# Patient Record
Sex: MALE | Race: WHITE | NOT HISPANIC OR LATINO | Employment: FULL TIME | URBAN - METROPOLITAN AREA
[De-identification: names, ages, dates, MRNs, and addresses within clinical notes are randomized per-mention and may not be internally consistent; named-entity substitution may affect disease eponyms.]

---

## 2023-08-10 ENCOUNTER — APPOINTMENT (OUTPATIENT)
Dept: PRIMARY CARE | Facility: CLINIC | Age: 38
End: 2023-08-10
Payer: COMMERCIAL

## 2023-11-02 ENCOUNTER — TELEPHONE (OUTPATIENT)
Dept: PRIMARY CARE | Facility: CLINIC | Age: 38
End: 2023-11-02

## 2023-11-02 NOTE — TELEPHONE ENCOUNTER
Patient would like to have blood work done before his appointment on 12-19-23.  Wants to have it taken before his insurance resets at the end of the year.  Can you put in the order and let him know through [employer].  Thank you.

## 2023-11-26 DIAGNOSIS — Z00.00 WELL ADULT EXAM: Primary | ICD-10-CM

## 2023-12-19 ENCOUNTER — APPOINTMENT (OUTPATIENT)
Dept: PRIMARY CARE | Facility: CLINIC | Age: 38
End: 2023-12-19
Payer: COMMERCIAL

## 2024-01-25 ENCOUNTER — APPOINTMENT (OUTPATIENT)
Dept: PRIMARY CARE | Facility: CLINIC | Age: 39
End: 2024-01-25
Payer: COMMERCIAL

## 2024-03-19 ENCOUNTER — APPOINTMENT (OUTPATIENT)
Dept: PRIMARY CARE | Facility: CLINIC | Age: 39
End: 2024-03-19
Payer: COMMERCIAL

## 2024-03-29 ENCOUNTER — OFFICE VISIT (OUTPATIENT)
Dept: PRIMARY CARE | Facility: CLINIC | Age: 39
End: 2024-03-29
Payer: COMMERCIAL

## 2024-03-29 VITALS
HEIGHT: 68 IN | DIASTOLIC BLOOD PRESSURE: 88 MMHG | BODY MASS INDEX: 23.16 KG/M2 | WEIGHT: 152.8 LBS | SYSTOLIC BLOOD PRESSURE: 136 MMHG

## 2024-03-29 DIAGNOSIS — Z00.00 WELL ADULT EXAM: Primary | ICD-10-CM

## 2024-03-29 DIAGNOSIS — R10.9 ABDOMINAL WALL PAIN: ICD-10-CM

## 2024-03-29 PROCEDURE — 99395 PREV VISIT EST AGE 18-39: CPT | Performed by: INTERNAL MEDICINE

## 2024-03-29 ASSESSMENT — PATIENT HEALTH QUESTIONNAIRE - PHQ9
1. LITTLE INTEREST OR PLEASURE IN DOING THINGS: NOT AT ALL
2. FEELING DOWN, DEPRESSED OR HOPELESS: NOT AT ALL
SUM OF ALL RESPONSES TO PHQ9 QUESTIONS 1 AND 2: 0

## 2024-03-29 NOTE — PROGRESS NOTES
"SUBJECTIVE:   Negro Melo is a 39 y.o. male presenting for his annual checkup.  No current outpatient medications on file.     No current facility-administered medications for this visit.     Allergies: Patient has no known allergies.     ROS:  Feeling well. No dyspnea or chest pain on exertion. No abdominal pain, change in bowel habits, black or bloody stools. No urinary tract or prostatic symptoms. No neurological complaints.    OBJECTIVE:   The patient appears well, alert, oriented x 3, in no distress.   /88 (BP Location: Left arm, Patient Position: Sitting, BP Cuff Size: Adult)   Ht 1.734 m (5' 8.25\")   Wt 69.3 kg (152 lb 12.8 oz)   BMI 23.06 kg/m²   ENT normal.  Neck supple. No adenopathy or thyromegaly. HEATHER. Lungs are clear, good air entry, no wheezes, rhonchi or rales. S1 and S2 normal, no murmurs, regular rate and rhythm. Abdomen is soft without tenderness, guarding, mass or organomegaly.  Extremities show no edema, normal peripheral pulses. Neurological is normal without focal findings.    ASSESSMENT:   healthy adult male    PLAN:   continue current healthy lifestyle patterns and return for routine annual checkups    #1 lipids-improved. Very low Kotlik risk score. Focus on diet and exercise. Recheck 6 to 12 months  #2 anxiety-doing well without treatment  #3 abd pain- low-grade, migrating.  Coming and going over the past 18 months.  Notably better after massage therapy.  Began after physical injury.  Normal exam.  I believe this is musculoskeletal.  We reviewed this at length.  Reviewed differential diagnosis.  Offered CT scan abdomen.  At this point patient wishes to hold off which I think is reasonable.  Will try physical therapy and follow-up in a few weeks if not resolved.       "

## 2024-03-30 ENCOUNTER — LAB (OUTPATIENT)
Dept: LAB | Facility: LAB | Age: 39
End: 2024-03-30
Payer: COMMERCIAL

## 2024-03-30 DIAGNOSIS — Z00.00 WELL ADULT EXAM: ICD-10-CM

## 2024-03-30 DIAGNOSIS — D72.819 LEUKOPENIA, UNSPECIFIED TYPE: Primary | ICD-10-CM

## 2024-03-30 PROCEDURE — 36415 COLL VENOUS BLD VENIPUNCTURE: CPT

## 2024-03-30 PROCEDURE — 85027 COMPLETE CBC AUTOMATED: CPT

## 2024-03-30 PROCEDURE — 83690 ASSAY OF LIPASE: CPT

## 2024-03-30 PROCEDURE — 80053 COMPREHEN METABOLIC PANEL: CPT

## 2024-03-30 PROCEDURE — 80061 LIPID PANEL: CPT

## 2024-03-30 PROCEDURE — 83036 HEMOGLOBIN GLYCOSYLATED A1C: CPT

## 2024-03-31 LAB
ALBUMIN SERPL BCP-MCNC: 5 G/DL (ref 3.4–5)
ALP SERPL-CCNC: 52 U/L (ref 33–120)
ALT SERPL W P-5'-P-CCNC: 18 U/L (ref 10–52)
ANION GAP SERPL CALC-SCNC: 14 MMOL/L (ref 10–20)
AST SERPL W P-5'-P-CCNC: 18 U/L (ref 9–39)
BILIRUB SERPL-MCNC: 0.8 MG/DL (ref 0–1.2)
BUN SERPL-MCNC: 19 MG/DL (ref 6–23)
CALCIUM SERPL-MCNC: 10 MG/DL (ref 8.6–10.6)
CHLORIDE SERPL-SCNC: 102 MMOL/L (ref 98–107)
CHOLEST SERPL-MCNC: 246 MG/DL (ref 0–199)
CHOLESTEROL/HDL RATIO: 5.1
CO2 SERPL-SCNC: 27 MMOL/L (ref 21–32)
CREAT SERPL-MCNC: 1 MG/DL (ref 0.5–1.3)
EGFRCR SERPLBLD CKD-EPI 2021: >90 ML/MIN/1.73M*2
ERYTHROCYTE [DISTWIDTH] IN BLOOD BY AUTOMATED COUNT: 12.3 % (ref 11.5–14.5)
EST. AVERAGE GLUCOSE BLD GHB EST-MCNC: 94 MG/DL
GLUCOSE SERPL-MCNC: 92 MG/DL (ref 74–99)
HBA1C MFR BLD: 4.9 %
HCT VFR BLD AUTO: 42.9 % (ref 41–52)
HDLC SERPL-MCNC: 48.1 MG/DL
HGB BLD-MCNC: 14.8 G/DL (ref 13.5–17.5)
LDLC SERPL CALC-MCNC: 182 MG/DL
LIPASE SERPL-CCNC: 25 U/L (ref 9–82)
MCH RBC QN AUTO: 31.4 PG (ref 26–34)
MCHC RBC AUTO-ENTMCNC: 34.5 G/DL (ref 32–36)
MCV RBC AUTO: 91 FL (ref 80–100)
NON HDL CHOLESTEROL: 198 MG/DL (ref 0–149)
NRBC BLD-RTO: 0 /100 WBCS (ref 0–0)
PLATELET # BLD AUTO: 329 X10*3/UL (ref 150–450)
POTASSIUM SERPL-SCNC: 4.4 MMOL/L (ref 3.5–5.3)
PROT SERPL-MCNC: 7.5 G/DL (ref 6.4–8.2)
RBC # BLD AUTO: 4.72 X10*6/UL (ref 4.5–5.9)
SODIUM SERPL-SCNC: 139 MMOL/L (ref 136–145)
TRIGL SERPL-MCNC: 82 MG/DL (ref 0–149)
VLDL: 16 MG/DL (ref 0–40)
WBC # BLD AUTO: 4.1 X10*3/UL (ref 4.4–11.3)

## 2024-04-15 ENCOUNTER — APPOINTMENT (OUTPATIENT)
Dept: PRIMARY CARE | Facility: CLINIC | Age: 39
End: 2024-04-15
Payer: COMMERCIAL

## 2024-04-15 ENCOUNTER — TELEPHONE (OUTPATIENT)
Dept: PRIMARY CARE | Facility: CLINIC | Age: 39
End: 2024-04-15

## 2024-04-15 NOTE — TELEPHONE ENCOUNTER
Pt left a msg asking for a phone call to put his mind at ease regarding his WBC count.  He leaves out of town the end of the week and would like to speak to you or have an apt before he leaves.

## 2024-04-24 ENCOUNTER — APPOINTMENT (OUTPATIENT)
Dept: PRIMARY CARE | Facility: CLINIC | Age: 39
End: 2024-04-24
Payer: COMMERCIAL

## 2024-04-26 ENCOUNTER — EVALUATION (OUTPATIENT)
Dept: PHYSICAL THERAPY | Facility: CLINIC | Age: 39
End: 2024-04-26
Payer: COMMERCIAL

## 2024-04-26 DIAGNOSIS — R10.9 ABDOMINAL WALL PAIN: Primary | ICD-10-CM

## 2024-04-26 PROCEDURE — 97110 THERAPEUTIC EXERCISES: CPT | Mod: GP | Performed by: PHYSICAL THERAPIST

## 2024-04-26 PROCEDURE — 97161 PT EVAL LOW COMPLEX 20 MIN: CPT | Mod: GP | Performed by: PHYSICAL THERAPIST

## 2024-04-26 ASSESSMENT — PATIENT HEALTH QUESTIONNAIRE - PHQ9
1. LITTLE INTEREST OR PLEASURE IN DOING THINGS: NOT AT ALL
SUM OF ALL RESPONSES TO PHQ9 QUESTIONS 1 AND 2: 0
2. FEELING DOWN, DEPRESSED OR HOPELESS: NOT AT ALL

## 2024-04-26 ASSESSMENT — ENCOUNTER SYMPTOMS
LOSS OF SENSATION IN FEET: 0
DEPRESSION: 0
OCCASIONAL FEELINGS OF UNSTEADINESS: 0

## 2024-04-26 NOTE — PROGRESS NOTES
Physical Therapy Evaluation    Patient Name: Negro Melo  MRN: [id number]  Today's Date: 4/26/2024  Visit: 1/mn  DOS/DOI:  1/1/23  Referred by:  Ivis Mabry  Time Calculation  Start Time: 1105  Stop Time: 1153  Time Calculation (min): 48 min  PT Evaluation Time Entry  PT Evaluation (Low) Time Entry: 38  PT Therapeutic Procedures Time Entry  Therapeutic Exercise Time Entry: 10                 Diagnosis:   1. Abdominal wall pain  Referral to Physical Therapy    Follow Up In Physical Therapy              PMH  - pelvic floor pain  - R shld dislocation    HPI  In Jan of 2023 he was skiing and came over a patch of man-made snow which jerked his trunk backward and he felt a strain through his abdominals.  He has felt pain at various locations through abdomen ever since.  He went to a massage therapist at [facility] and it went away for 3 months.  It returned and continued to be at various locations.  He has seen massage therapy a few times again which helped but it didn't last as long.  He has also seen his MD and was referred to PT. His bloodwork was negative.  No imagine done.    Precautions  None    SUBJECTIVE  Symptoms:  - he reports pain at the superior aspect of the abdominal mm and along the L side.  It is rated 1-2/10 and described as an annoying discomfort.  No N&T.  No weakness in the abdominal mm or LE.  It increases with stress, prone position, eating, and focusing on the pain.   it decreases if he doesn't think about it, massage, ice and stretching.  Functionally he is limited in sitting as he needs to change position if it becomes uncomfortable.    Patient's Living Environment:  - 2 story home  - his parents will stay with him 4 months out of the [place]  - he works as an .  Sits most of the day, but not limited in ADLS.    Previous Level of Function:  -  no change in activity level  - he still runs    Patient's Therapy Goals:  - find rx that gives more sustainable relief.    OBJECTIVE  Observation:    -  rounded posture. L scap higher than R    Palpation:   - tender at anterior cartilage of L Rib 10   - L ASIS lower    AROM:  - spinal Rot = WFL B, flex = WFL,  ext = WFL, SB = 50%    MMT:  - TA = strong  - upper abdominal = 5/5  - lower abdominal = 4/5  - oblique abdominal = 5/5 B    Special Tests:  - Negative B SLR, compression over load  - Positive PA to T 10 (slight increase in anterior rib pain on L)  - HS tight B (45 with SLR)    Joint Mobility:   - decreased joint mobility with PA    ASSESSMENT  The patient is a 38 y/o male presenting to PT with chronic abdominal pain that varies in location.  Today it presents as inflammation of the costocartilage, primarily at the L Rib 9 & 10 level.  It is only reproduced with palpation and a PA to T10.  Otherwise his abdominal mm are strong and pain free.  He does have tight HS B and rounded posture.  The rounded posture may contribute by compressing on cartilage that is inflamed.  He will benefit from continued therapy to improve posture and core stability.    Rehab potential:  fair  Clinical presentation:  Stable and/or uncomplicated characteristics       PLAN  Frequency/duration:  1-2x/wk for 6 wks  Planned Interventions:  MT, neuro re-ed, therapeutic ex,  modalities prn  Patient/caregiver agrees with POC:  yes    TODAY'S TREATMENT  - evaluation of the abdomin completed.  He was educated on his dx and anatomy of the ribs.  He was instructed to sit/stand with proper posture.  - he was given a HEP as seen below:    Access Code: [email or web address]  URL: [email or web address]  Date: 04/26/2024  Prepared by: Chiara Rose    Exercises  - Prone Press Up  - 1 x daily - 7 x weekly - 3 sets - 10 reps  - Supine Transversus Abdominis Bracing - Hands on Stomach  - 1 x daily - 7 x weekly - 3 sets - 10 reps  - Prone Scapular Slide with Shoulder Extension  - 1 x daily - 7 x weekly - 3 sets - 10 reps  - Standing Shoulder Row with Anchored Resistance  - 1 x daily - 7 x  weekly - 3 sets - 10 reps    Goals:   1.  he will be independent with his HEP  2.  demonstrate proper posture for decreased compression on the abdomen  3.  demonstrate increased HS flexibility (60 degrees B with SLR) for improve spinal/pelvic mechanics  4.  No pain with palpation

## 2024-05-03 ENCOUNTER — TREATMENT (OUTPATIENT)
Dept: PHYSICAL THERAPY | Facility: CLINIC | Age: 39
End: 2024-05-03
Payer: COMMERCIAL

## 2024-05-03 DIAGNOSIS — R10.9 ABDOMINAL WALL PAIN: ICD-10-CM

## 2024-05-03 PROCEDURE — 97110 THERAPEUTIC EXERCISES: CPT | Mod: GP | Performed by: PHYSICAL THERAPIST

## 2024-05-03 NOTE — PROGRESS NOTES
Physical Therapy Treatment    Patient Name: Negro Melo  MRN: [id number]  Today's Date: 5/3/2024  Visit 2/mn  DOI/DOS:  1/1/23  Referred by:   Ivis Mabry  Time Calculation  Start Time: 0852  Stop Time: 0936  Time Calculation (min): 44 min     PT Therapeutic Procedures Time Entry  Therapeutic Exercise Time Entry: 44                 Diagnosis:   1. Abdominal wall pain  Follow Up In Physical Therapy          SUBJECTIVE:  Currently he has 0/10 pain.  At times he can have pain along the top of his abdominal mm.  The HEP is helping.  He was able to bike for 10 miles without pain.  He can also lie prone without pain.  HEP compliance: yes    OBJECTIVE:  - continues to sit with rounded posture  - TA = strong    Treatment:  - initiated exercise for core stability with emphasis on TA, glut and multifidus.  Therapeutic Exercise  Therapeutic Exercise Activity 1: diaphragmatic breathing x20  Therapeutic Exercise Activity 2: knee tap 3x12  Therapeutic Exercise Activity 3: HS stretch with strap in supine, B 30 sec x3  Therapeutic Exercise Activity 4: dead bug L1, 3x12  Therapeutic Exercise Activity 5: bridge 3x12  Therapeutic Exercise Activity 6: S/L hip abd B, 2x12  Therapeutic Exercise Activity 7: prone hip ext, B 2x12  Therapeutic Exercise Activity 8: squat hold 10 sec x10                ASSESSMENT:  Good afsaneh of exercise.  He reports muscle use vs abdominal pain.  He needs cues for form at times.  He is progressing well and symptoms are decreasing.  He will benefit from continued therapy to improve posture and core stability.    PLAN:  Increase exercise as afsaneh

## 2024-05-07 ENCOUNTER — TREATMENT (OUTPATIENT)
Dept: PHYSICAL THERAPY | Facility: CLINIC | Age: 39
End: 2024-05-07
Payer: COMMERCIAL

## 2024-05-07 DIAGNOSIS — R10.9 ABDOMINAL WALL PAIN: ICD-10-CM

## 2024-05-07 PROCEDURE — 97110 THERAPEUTIC EXERCISES: CPT | Mod: GP | Performed by: PHYSICAL THERAPIST

## 2024-05-07 NOTE — PROGRESS NOTES
"  Physical Therapy Treatment    Patient Name: Negro Melo  MRN: [id number]  Today's Date: 5/7/2024  Visit 3/mn  DOI/DOS:  1/1/23  Referred by:   Ivis Mabry  Time Calculation  Start Time: 1707  Stop Time: 1807  Time Calculation (min): 60 min     PT Therapeutic Procedures Time Entry  Therapeutic Exercise Time Entry: 60                 Diagnosis:   1. Abdominal wall pain  Follow Up In Physical Therapy          SUBJECTIVE:  Today he reports dong well since his last session - symptoms have been \"very minimal\".  He had a little pain at the L costocartilage area after eating lunch but then it went away.  He was able to work in the yard over the weekend without issue.    HEP compliance: no    OBJECTIVE:  - TA = strong    Treatment:  - continued with core stab.  Advanced dead bug to level 2.  Therapeutic Exercise  Therapeutic Exercise Activity 1: diaphragmatic breathing x20  Therapeutic Exercise Activity 2: knee tap 3x12  Therapeutic Exercise Activity 3: HS stretch with strap in supine, B 30 sec x3  Therapeutic Exercise Activity 4: dead bug L2, 3x12  Therapeutic Exercise Activity 5: bridge 3x12  Therapeutic Exercise Activity 6: S/L hip abd B, 2x12                ASSESSMENT:  Tolerated rx well.  He is progressing and has minimal pain at this time.  No pain with any exercise today.  He will benefit from continued therapy to further improve abdominal pain for return to all ADLS without issue.    PLAN:  Add WB stab exercise such as rows    "

## 2024-05-09 ENCOUNTER — TREATMENT (OUTPATIENT)
Dept: PHYSICAL THERAPY | Facility: CLINIC | Age: 39
End: 2024-05-09
Payer: COMMERCIAL

## 2024-05-09 DIAGNOSIS — R10.9 ABDOMINAL WALL PAIN: ICD-10-CM

## 2024-05-09 PROCEDURE — 97110 THERAPEUTIC EXERCISES: CPT | Mod: GP | Performed by: PHYSICAL THERAPIST

## 2024-05-09 NOTE — PROGRESS NOTES
Physical Therapy Treatment    Patient Name: Negro Melo  MRN: [id number]  Today's Date: 5/9/2024  Visit 4/mn  DOI/DOS:  1/1/23  Referred by:   Ivis Mabry  Time Calculation  Start Time: 1216  Stop Time: 1307  Time Calculation (min): 51 min     PT Therapeutic Procedures Time Entry  Therapeutic Exercise Time Entry: 51                 Diagnosis:   1. Abdominal wall pain  Follow Up In Physical Therapy          SUBJECTIVE:  Today he reports L sided anterior soreness at the costochondral area of the lower ribs rated 1/10.  This started after getting up and eating his breakfast.  He is able to sleep without issue now.  He doesn't need to ice anymore and doesn't feel any symptoms after eating now.  HEP compliance: yes    OBJECTIVE:  - 1/10 pain with palpation of the L costochondral cartilage     Treatment:  - continued with core stab exercise.  Resumed prone hip ext and squat hold.  Therapeutic Exercise  Therapeutic Exercise Activity 2: knee tap 3x12  Therapeutic Exercise Activity 3: HS stretch with strap in supine, B 30 sec x3  Therapeutic Exercise Activity 4: dead bug L2, 3x12  Therapeutic Exercise Activity 5: bridge 3x12  Therapeutic Exercise Activity 6: S/L hip abd B, 2x12  Therapeutic Exercise Activity 7: prone hip ext, B 3x12  Therapeutic Exercise Activity 8: squat hold 10 sec x10  Therapeutic Exercise Activity 9: prone press up x12                ASSESSMENT:  0/10 after rx.  Good tolerance of exercises.  He is progressing well and will benefit from continued therapy to reduce pain and improve core stab.    PLAN:  Anticipate discharge soon due to improvement

## 2024-05-14 ENCOUNTER — TREATMENT (OUTPATIENT)
Dept: PHYSICAL THERAPY | Facility: CLINIC | Age: 39
End: 2024-05-14
Payer: COMMERCIAL

## 2024-05-14 DIAGNOSIS — R10.9 ABDOMINAL WALL PAIN: Primary | ICD-10-CM

## 2024-05-14 PROCEDURE — 97110 THERAPEUTIC EXERCISES: CPT | Mod: GP | Performed by: PHYSICAL THERAPIST

## 2024-05-14 NOTE — PROGRESS NOTES
Physical Therapy Treatment    Patient Name: Negro Melo  MRN: [id number]  Today's Date: 5/14/2024  Visit 5/mn  DOI/DOS:  1/1/23  Referred by:   sherly mcconnell  Time Calculation  Start Time: 1701  Stop Time: 1748  Time Calculation (min): 47 min     PT Therapeutic Procedures Time Entry  Therapeutic Exercise Time Entry: 47                 Diagnosis:   1. Abdominal wall pain            SUBJECTIVE:  He felt a 1-2/10 ached along the L costochondral area over the weekend.  He did go for a run and did a lot of gardening which may have contributed.  With rounded posture while driving he could feel more.  He did well after last rx.  HEP compliance: partially    OBJECTIVE:  - has significantly rounded posture in sitting which does cause more pain    Treatment:  - continued with core stab and stretching.  Advanced exercise as afsaneh.  Therapeutic Exercise  Therapeutic Exercise Activity 2: knee tap 3x12  Therapeutic Exercise Activity 4: dead bug L2, 3x12  Therapeutic Exercise Activity 5: bridge with feet on ball, 3x12  Therapeutic Exercise Activity 6: S/L hip abd B, 3x12  Therapeutic Exercise Activity 7: prone hip ext, B 3x12  Therapeutic Exercise Activity 9: prone press up x15                ASSESSMENT:  He reports workout soreness vs pain after rx.  His symptoms either occur randomly or when in rounded posture.  They resolve with stretching and core strength.  He does respond well to rx though the cause of his pain is somewhat ambiguous and may be due to irritated costochondral cartilage.    PLAN:  Possible discharge at next session to a HEP due to progress and ability to exercise independently.

## 2024-05-16 ENCOUNTER — TREATMENT (OUTPATIENT)
Dept: PHYSICAL THERAPY | Facility: CLINIC | Age: 39
End: 2024-05-16
Payer: COMMERCIAL

## 2024-05-16 DIAGNOSIS — R10.9 ABDOMINAL WALL PAIN: ICD-10-CM

## 2024-05-16 PROCEDURE — 97110 THERAPEUTIC EXERCISES: CPT | Mod: GP | Performed by: PHYSICAL THERAPIST

## 2024-05-16 NOTE — PROGRESS NOTES
Physical Therapy Treatment    Patient Name: Negro Melo  MRN: [id number]  Today's Date: 5/16/2024  Visit 6/mn  DOI/DOS:  1/1/23  Referred by:   sherly mcconnell  Time Calculation  Start Time: 1217  Stop Time: 1318  Time Calculation (min): 61 min     PT Therapeutic Procedures Time Entry  Therapeutic Exercise Time Entry: 61                 Diagnosis:   1. Abdominal wall pain  Follow Up In Physical Therapy          SUBJECTIVE:  He is a little sore (rated 1/10) at the L costochondral area, but it is minimal and resolved.  If he is consistent with his exercise it improves.  He is sleeping well - no pain.  HEP compliance: yes    OBJECTIVE:  - TA = strong, rectus abdominus = 5/5, obliques = 5/5, lower abdominal = 4+/5  - no pain with palpation of the ribs or costochondral area  - HS = 50 B with SLR  - good multifidus activation during prone hip ext B  - no scoliosis present  - sits with spine in significant flex and L SB    Treatment:  - continued with core stab and posture exercises.  Added quad LE ext and shld flex for multifidus activation.  Therapeutic Exercise  Therapeutic Exercise Activity 5: bridge with feet on ball, 3x12  Therapeutic Exercise Activity 6: S/L hip abd B, 3x12  Therapeutic Exercise Activity 7: prone hip ext, B 3x12  Therapeutic Exercise Activity 9: prone press up x12  Therapeutic Exercise Activity 10: shld flex for multifidus, 3 lbs, 2x10  Therapeutic Exercise Activity 11: quad LE ext, x10 B              - his HEP was updated as seen below:  Access Code: [email or web address]  URL: [email or web address]  Date: 05/16/2024  Prepared by: Chiara Rose    Exercises  - Bridge with Heels on Swiss Ball  - 1 x daily - 7 x weekly - 3 sets - 10 reps  - Beginner Front Arm Support  - 1 x daily - 7 x weekly - 3 sets - 10 reps  - Curl Up with Reach  - 1 x daily - 7 x weekly - 3 sets - 10 reps  - Standing Shoulder Flexion to 90 Degrees with Dumbbells  - 1 x daily - 7 x weekly - 3 sets - 10 reps  - Supine  Active Straight Leg Raise  - 1 x daily - 7 x weekly - 3 sets - 10 reps    ASSESSMENT:  0/10 pain after rx.  He has been progressing well and as the primary cause of his pain is rounded posture, he just needs to focus on maintaining proper posture and performing core stab exercise.      PLAN:  He will continue independently for 2 weeks and then return for discharge.

## 2024-05-21 ENCOUNTER — APPOINTMENT (OUTPATIENT)
Dept: PHYSICAL THERAPY | Facility: CLINIC | Age: 39
End: 2024-05-21
Payer: COMMERCIAL

## 2024-05-23 ENCOUNTER — APPOINTMENT (OUTPATIENT)
Dept: PHYSICAL THERAPY | Facility: CLINIC | Age: 39
End: 2024-05-23
Payer: COMMERCIAL

## 2024-05-30 ENCOUNTER — APPOINTMENT (OUTPATIENT)
Dept: PHYSICAL THERAPY | Facility: CLINIC | Age: 39
End: 2024-05-30
Payer: COMMERCIAL

## 2024-06-06 ENCOUNTER — TREATMENT (OUTPATIENT)
Dept: PHYSICAL THERAPY | Facility: CLINIC | Age: 39
End: 2024-06-06
Payer: COMMERCIAL

## 2024-06-06 DIAGNOSIS — R10.9 ABDOMINAL WALL PAIN: Primary | ICD-10-CM

## 2024-06-06 PROCEDURE — 97110 THERAPEUTIC EXERCISES: CPT | Mod: GP | Performed by: PHYSICAL THERAPIST

## 2024-06-06 NOTE — PROGRESS NOTES
Physical Therapy Treatment/Discharge    Patient Name: Negro Melo  MRN: [id number]  Today's Date: 6/6/2024  Visit 7/mn  DOI/DOS:  1/1/23  Referred by:   Ivis Mabry  Time Calculation  Start Time: 1220  Stop Time: 1304  Time Calculation (min): 44 min     PT Therapeutic Procedures Time Entry  Therapeutic Exercise Time Entry: 44                 Diagnosis:   1. Abdominal wall pain            SUBJECTIVE:  0/10 pain at present.  He seldom has pain.  He has started working out again in the gym and at home without issue.  He still find he slouches and has poor posture when sitting.  Not limited in ADLS  HEP compliance: yes    OBJECTIVE:  - no pain with palpation of the L costochondral area  - TA = strong, upper abdominals = 5/5, lower abdominals = 4+/5  - HS flexibility = 65 with SLR B  - Negative PA to T6-L5    Treatment:  - continued with stab exercise. Added 2 lb weight to hip abd.    Therapeutic Exercise  Therapeutic Exercise Activity 2: crunch 3x12  Therapeutic Exercise Activity 5: bridge with feet on ball, 3x12  Therapeutic Exercise Activity 6: S/L hip abd B, 2 lbs, 3x12  Therapeutic Exercise Activity 9: prone press up x20                ASSESSMENT:  Lizbet rx well - no pain with any activity though he was challenged by the addition of weight to hip abd.  He has met all his goals and is not limited in ADLS.  He will need to be aware of his posture as this does effect his symptoms by compressing the ribs and costochondral area.  He is able to continue independently with his HEP.    PLAN:  Discharge to HEP that has already been given.

## 2024-06-25 ENCOUNTER — APPOINTMENT (OUTPATIENT)
Dept: PHYSICAL THERAPY | Facility: CLINIC | Age: 39
End: 2024-06-25
Payer: COMMERCIAL

## 2025-03-24 DIAGNOSIS — F41.9 ANXIETY: Primary | ICD-10-CM

## 2025-03-24 RX ORDER — HYDROXYZINE PAMOATE 25 MG/1
25 CAPSULE ORAL 3 TIMES DAILY PRN
Qty: 30 CAPSULE | Refills: 0 | Status: SHIPPED | OUTPATIENT
Start: 2025-03-24 | End: 2025-04-03

## 2025-04-10 ENCOUNTER — TELEPHONE (OUTPATIENT)
Dept: PRIMARY CARE | Facility: CLINIC | Age: 40
End: 2025-04-10
Payer: COMMERCIAL

## 2025-04-10 NOTE — TELEPHONE ENCOUNTER
Patient called, ROBER, said he'd communicated with you after-hours a couple weeks ago about troubles he was having with anxiety episodes, at which time you Rx'd Hydroxyzine for him.      Today, while at work, he had a pretty severe anxiety episode, took  Hydroxyzine caps, but said it didn't really do a lot.  He went hope and his symptoms have finally subsided some, but still having anxiety.    He is leaving for [place] next Thur and was hoping he could get in for a follow-up with you and to see about maybe trying another med (you do not have anything available except for [facility] appointments) or can you Rx another med for him?  He said he took Buspar some time back.     Please advise.

## 2025-04-11 DIAGNOSIS — F41.9 ANXIETY: Primary | ICD-10-CM

## 2025-04-11 RX ORDER — BUSPIRONE HYDROCHLORIDE 5 MG/1
5 TABLET ORAL 2 TIMES DAILY
Qty: 180 TABLET | Refills: 3 | Status: SHIPPED | OUTPATIENT
Start: 2025-04-11 | End: 2026-04-11

## 2025-07-24 ENCOUNTER — APPOINTMENT (OUTPATIENT)
Dept: PRIMARY CARE | Facility: CLINIC | Age: 40
End: 2025-07-24
Payer: COMMERCIAL

## 2025-07-24 VITALS
WEIGHT: 157.2 LBS | HEIGHT: 69 IN | DIASTOLIC BLOOD PRESSURE: 84 MMHG | SYSTOLIC BLOOD PRESSURE: 124 MMHG | BODY MASS INDEX: 23.28 KG/M2

## 2025-07-24 DIAGNOSIS — Z13.6 SCREENING FOR CARDIOVASCULAR CONDITION: ICD-10-CM

## 2025-07-24 DIAGNOSIS — Z13.0 SCREENING FOR BLOOD DISEASE: ICD-10-CM

## 2025-07-24 DIAGNOSIS — Z00.00 WELL ADULT EXAM: Primary | ICD-10-CM

## 2025-07-24 PROBLEM — F41.9 ANXIETY: Status: ACTIVE | Noted: 2025-07-24

## 2025-07-24 PROCEDURE — 99395 PREV VISIT EST AGE 18-39: CPT | Performed by: INTERNAL MEDICINE

## 2025-07-24 PROCEDURE — 1036F TOBACCO NON-USER: CPT | Performed by: INTERNAL MEDICINE

## 2025-07-24 PROCEDURE — 3008F BODY MASS INDEX DOCD: CPT | Performed by: INTERNAL MEDICINE

## 2025-07-24 ASSESSMENT — PATIENT HEALTH QUESTIONNAIRE - PHQ9
2. FEELING DOWN, DEPRESSED OR HOPELESS: NOT AT ALL
SUM OF ALL RESPONSES TO PHQ9 QUESTIONS 1 AND 2: 0
1. LITTLE INTEREST OR PLEASURE IN DOING THINGS: NOT AT ALL

## 2025-07-24 NOTE — PROGRESS NOTES
"SUBJECTIVE:   Negro Melo is a 40 y.o. male presenting for his annual checkup.    Increased exercise/activity  Diet \"pretty good\"  Anxiety well controlled w/ low-dose rx    Current Outpatient Medications   Medication Sig Dispense Refill    busPIRone (Buspar) 5 mg tablet Take 1 tablet (5 mg) by mouth 2 times a day. 180 tablet 3    hydrOXYzine pamoate (VistariL) 25 mg capsule Take 1 capsule (25 mg) by mouth 3 times a day as needed for itching or anxiety for up to 10 days. 30 capsule 0     No current facility-administered medications for this visit.     Allergies: Patient has no known allergies.     ROS:  Feeling well. No dyspnea or chest pain on exertion. No abdominal pain, change in bowel habits, black or bloody stools. No urinary tract or prostatic symptoms. No neurological complaints.    OBJECTIVE:   The patient appears well, alert, oriented x 3, in no distress.   /84 (BP Location: Left arm, Patient Position: Sitting)   Ht 1.746 m (5' 8.75\")   Wt 71.3 kg (157 lb 3.2 oz)   BMI 23.38 kg/m²   ENT normal.  Neck supple. No adenopathy or thyromegaly. HEATHER. Lungs are clear, good air entry, no wheezes, rhonchi or rales. S1 and S2 normal, no murmurs, regular rate and rhythm. Abdomen is soft without tenderness, guarding, mass or organomegaly.  Extremities show no edema, normal peripheral pulses. Neurological is normal without focal findings.    ASSESSMENT:   healthy adult male    PLAN:   continue current healthy lifestyle patterns and return for routine annual checkups    #1 lipids- retest.    #2 anxiety- doing well      Fhx prostate cancer (1/2 brother).   Reviewed.  Rec PSA.  We discussed.  He declines         "

## 2025-07-25 ENCOUNTER — APPOINTMENT (OUTPATIENT)
Dept: PRIMARY CARE | Facility: CLINIC | Age: 40
End: 2025-07-25
Payer: COMMERCIAL

## 2025-08-02 LAB
ALT SERPL-CCNC: 15 U/L (ref 9–46)
ANION GAP SERPL CALCULATED.4IONS-SCNC: 12 MMOL/L (CALC) (ref 7–17)
BUN SERPL-MCNC: 22 MG/DL (ref 7–25)
BUN/CREAT SERPL: NORMAL (CALC) (ref 6–22)
CALCIUM SERPL-MCNC: 9.5 MG/DL (ref 8.6–10.3)
CHLORIDE SERPL-SCNC: 105 MMOL/L (ref 98–110)
CHOLEST SERPL-MCNC: 244 MG/DL
CHOLEST/HDLC SERPL: 5.3 (CALC)
CO2 SERPL-SCNC: 22 MMOL/L (ref 20–32)
CREAT SERPL-MCNC: 0.98 MG/DL (ref 0.6–1.26)
EGFRCR SERPLBLD CKD-EPI 2021: 101 ML/MIN/1.73M2
ERYTHROCYTE [DISTWIDTH] IN BLOOD BY AUTOMATED COUNT: 12.5 % (ref 11–15)
GLUCOSE SERPL-MCNC: 81 MG/DL (ref 65–99)
HCT VFR BLD AUTO: 43.7 % (ref 38.5–50)
HDLC SERPL-MCNC: 46 MG/DL
HGB BLD-MCNC: 14.6 G/DL (ref 13.2–17.1)
LDLC SERPL CALC-MCNC: 176 MG/DL (CALC)
MCH RBC QN AUTO: 31.7 PG (ref 27–33)
MCHC RBC AUTO-ENTMCNC: 33.4 G/DL (ref 32–36)
MCV RBC AUTO: 94.8 FL (ref 80–100)
NONHDLC SERPL-MCNC: 198 MG/DL (CALC)
PLATELET # BLD AUTO: 300 THOUSAND/UL (ref 140–400)
PMV BLD REES-ECKER: 9.8 FL (ref 7.5–12.5)
POTASSIUM SERPL-SCNC: 4.1 MMOL/L (ref 3.5–5.3)
RBC # BLD AUTO: 4.61 MILLION/UL (ref 4.2–5.8)
SODIUM SERPL-SCNC: 139 MMOL/L (ref 135–146)
TRIGL SERPL-MCNC: 98 MG/DL
WBC # BLD AUTO: 4.6 THOUSAND/UL (ref 3.8–10.8)

## 2025-08-05 DIAGNOSIS — E78.5 HYPERLIPIDEMIA, UNSPECIFIED HYPERLIPIDEMIA TYPE: Primary | ICD-10-CM

## 2025-08-05 DIAGNOSIS — Z12.5 PROSTATE CANCER SCREENING: ICD-10-CM

## 2025-08-06 PROBLEM — E78.5 HYPERLIPIDEMIA: Status: ACTIVE | Noted: 2025-08-06

## 2025-08-06 LAB — PSA SERPL-MCNC: 0.32 NG/ML

## 2025-08-06 NOTE — PROGRESS NOTES
Nutrition Initial Assessment:     Patient Negro Melo is a 40 y.o. male being seen at [facility] who was referred by Ivis Mabry MD on 8/5/2025 for   1. Hyperlipidemia, unspecified hyperlipidemia type        2. Dietary counseling and surveillance            Nutrition Assessment    Problem List[1]    Nutrition History:  Food & Nutrition History: Pt reports having high cholesterol levels, pt reports its hereditary, had it for a long time. Looking for guidance on healthy meals and snacks to help lower total cholesterol and LDL cholesterol.   Food Allergies: None;  Food Intolerances: sushi, shellfish   Vitamin/mineral intake: Multivitamin   Herbal supplements: None  Medication and Complementary/Alternative Medicine Use: none  GI Symptoms: None  Mouth Issues: denies; Teeth Issues: no issues  Sleep Habits: 5-6 hours continuous, 5-6 hours disrupted, 7+ hours continuous, 7+ hours disrupted    Diet Recall: wake-up: 6 am  Meal 1: 830 am: banana, oatmeal packet with no sugar, egg gouda sandwich from [employer]- egg, brown, and cheese   Snack: skip  Meal 2: 12-1230pm: goes out to eat, heiens, lunch bowl chicken, brown rice, spinach, tomatoes- similar to chipolte bowl, [employer] bread- pick, sandwich and salad, jersey abbi sub- turkey sandwich with chips, lean cuisine   Snack: 3-5 pm: cereal with milk- honey bunches of oats, ice cream   Meal 3: 7-715 pm: grilled chicken breast, turkey burger, salmon kale zucchni, squash, sweet potato, whole grain pasta, baked potato   Snack: ice cream, [place] non fat yogurt, something sweet   Food Variety: Present  Oral Nutrition Supplement Use: None   Fluid Intake: black coffee, peppermint or lemon tea, water, flavored water  Energy Intake: Good (>/= 75% EEN)    Food Preparation:  Cooking: Patient  Grocery Shopping: Patient  Dining Out: Everyday    Physical Activity:   Pt reports goes biking, running, treadmill. 4-5 times a week 20-25 minutes. Pt reports 10,000 steps per day.      Food Insecurity: none    Anthropometrics:                            Weight History:   Daily Weight  07/24/25 : 71.3 kg (157 lb 3.2 oz)  03/29/24 : 69.3 kg (152 lb 12.8 oz)  12/20/22 : 67.6 kg (149 lb)  09/27/22 : 70.8 kg (156 lb)  08/05/22 : 69.1 kg (152 lb 6 oz)  07/22/22 : 69.9 kg (154 lb)  12/09/21 : 68.9 kg (152 lb)  09/30/21 : 69.6 kg (153 lb 6 oz)  01/14/21 : 70.8 kg (156 lb)  12/03/20 : 71.2 kg (157 lb)    Weight Change %: no significant weight changes. Weight stable       Nutrition Focused Physical Exam Findings:  Subcutaneous Fat Loss:   Defer Subcutaneous Fat Loss Assessment: Defer all  Defer All Reason: Visually appears well nourished with no signs of noticeable wasting    Muscle Wasting:  Defer Muscle Wasting Assessment: Defer all  Defer All Reason: Visually appears well nourished with no signs of noticeable wasting    Physical Findings:  Hair: Negative  Eyes: Negative  Nails: Negative  Skin: Negative  Respiratory: Negative  Edema: none      Nutrition Significant Labs:  Last Chem:     Chemistry    Lab Results   Component Value Date/Time     08/01/2025 0817    K 4.1 08/01/2025 0817     08/01/2025 0817    CO2 22 08/01/2025 0817    BUN 22 08/01/2025 0817    CREATININE 0.98 08/01/2025 0817    Lab Results   Component Value Date/Time    CALCIUM 9.5 08/01/2025 0817    ALKPHOS 52 03/30/2024 0935    AST 18 03/30/2024 0935    ALT 15 08/01/2025 0817    BILITOT 0.8 03/30/2024 0935       , CMP Trend:    Recent Labs     08/01/25  0817 03/30/24  0935 12/16/22  0900 01/14/20  0750 12/20/18  0659 02/02/18  1431   GLUCOSE 81 92 83   < > 89 145*    139 141   < > 139 138   K 4.1 4.4 4.0   < > 4.3 3.5    102 104   < > 102 103   CO2 22 27 26   < > 30 20*   ANIONGAP 12 14 15   < > 11 19   BUN 22 19 20   < > 19 17   CREATININE 0.98 1.00 1.01   < > 0.92 1.29   EGFR 101 >90  --   --   --   --    CALCIUM 9.5 10.0 9.1   < > 9.6 10.1   ALBUMIN  --  5.0  --   --  4.8 5.3*   ALKPHOS  --  52  --   --  67 54    PROT  --  7.5  --   --  7.3 7.9   AST  --  18  --   --  17 18   BILITOT  --  0.8  --   --  0.6 0.8   ALT 15 18 16   < > 20 17    < > = values in this interval not displayed.   , CBC Trend:   Recent Labs     08/01/25 0817 03/30/24  0935 12/16/22  0900 01/14/21  0801 01/14/20  0750   WBC 4.6 4.1* 4.8 5.1 5.0   NRBC  --  0.0  --  0.0 0.0   RBC 4.61 4.72 4.72 4.74 4.72   HGB 14.6 14.8 14.5 14.8 14.8   HCT 43.7 42.9 43.9 44.4 44.4   MCV 94.8 91 93 94 94   MCH 31.7 31.4  --   --   --    MCHC 33.4 34.5 33.0 33.3 33.3   RDW 12.5 12.3 12.6 11.9 12.2    329 299 309 316   , RFP + Serum Mag Trend:   Recent Labs     08/01/25 0817 03/30/24  0935 12/16/22  0900 01/14/20  0750 12/20/18  0659 02/02/18  1431   GLUCOSE 81 92 83   < > 89 145*    139 141   < > 139 138   K 4.1 4.4 4.0   < > 4.3 3.5    102 104   < > 102 103   CO2 22 27 26   < > 30 20*   ANIONGAP 12 14 15   < > 11 19   BUN 22 19 20   < > 19 17   CREATININE 0.98 1.00 1.01   < > 0.92 1.29   EGFR 101 >90  --   --   --   --    CALCIUM 9.5 10.0 9.1   < > 9.6 10.1   ALBUMIN  --  5.0  --   --  4.8 5.3*    < > = values in this interval not displayed.   , LFT Trend:   Recent Labs     08/01/25 0817 03/30/24  0935 12/16/22  0900 01/14/20  0750 12/20/18  0659 02/02/18  1431   ALBUMIN  --  5.0  --   --  4.8 5.3*   BILITOT  --  0.8  --   --  0.6 0.8   ALKPHOS  --  52  --   --  67 54   ALT 15 18 16   < > 20 17   AST  --  18  --   --  17 18   PROT  --  7.5  --   --  7.3 7.9    < > = values in this interval not displayed.   , DM Specific Labs Trend (Includes HgbA1C, antibodies & fasting insulin):   Recent Labs     03/30/24  0935   HGBA1C 4.9   , Lipid Panel Trend:    Recent Labs     08/01/25  0817 03/30/24  0935 12/16/22  0900 01/14/21  0801 01/14/20  0750   CHOL 244* 246* 241* 261* 243*   HDL 46 48.1 50.9 45.6 53.0   LDLCALC 176* 182*  --   --   --    LDLF  --   --  169* 186* 172*   VLDL  --  16 21 29 18   TRIG 98 82 105 145 88   , Iron Panel + Serum Ferritin  "Trend: No results for input(s): \"IRON\", \"UIBC\", \"TIBC\", \"IRONSAT\", \"FERRITIN\" in the last 03606 hours., Vitamin B12: No results found for: \"BGQNCBDQ17\" , Folate: No results found for: \"FOLATE\" , Vitamin D: No results found for: \"VITD25\" , CRP Trend (last 3): No results found for: \"HSCRP\"     Medications:  Current Outpatient Medications   Medication Instructions    busPIRone (BUSPAR) 5 mg, oral, 2 times daily    hydrOXYzine pamoate (VISTARIL) 25 mg, oral, 3 times daily PRN        Estimated Needs:  We did not discuss estimated needs today. Focused on building healthy meal and snack ideas.          Nutrition Diagnosis   Malnutrition Diagnosis  Patient has Malnutrition Diagnosis: No    Nutrition Diagnosis  Patient has Nutrition Diagnosis: Yes  Diagnosis Status (1): New  Nutrition Diagnosis 1: Food and nutrition related knowledge deficit  Related to (1): limited or lack of prior nutrition-related education  As Evidenced by (1): per pt report looking for guidance on healthy eating patterns, intake of unbalanced meals and snacks per diet recall       Nutrition Interventions/Recommendations   Nutrition Prescription:  Individualized Nutrition Prescription Provided for : Oral nutrition General Healthy Diet    Nutrition Interventions:   Food and Nutrient Delivery:   Meals & Snacks: Energy-modified diet, General Healthful Diet     Coordination of Care:   none    Nutrition Education: Content related nutrition education  Balanced meals using plate method, timing of meals, adequate hydration, benefits of exercise    Nutrition Education Topics Discussed:   Lifestyle Recommendations for Lowering Blood Cholesterol Levels:  Avoid foods high in saturated fat  These foods are often found in animal sources and tend to be solid at room temperature  Some common food sources include animal fat, butter, sour cream, full fat dairy products (such as milk, cream, and cheese), tropical oils (coconut, coconut oil, palm oil) and pastries  Avoid " fatty cuts of meat, such as brown, sausage, salami, pepperoni, and bologna  Avoid fried foods. Instead, bake, grill, or pan garber in a small amount of olive oil  Read food labels with saturated fat in mind  Aim for less than 10% of your daily calories coming from saturated fat.  Foods that contain less than 5% DV for saturated fat are considered low saturated fat foods   Foods that contain more than 20% DV for saturated fat are considered high in saturated fat  Use your judgment for foods that are between 5-20% DV per serving  Do include foods that are high in heart healthy fats (mono and poly-unsaturated fats)  These are fats are typically found from plant sources and tend to be liquid at room temperature   Some common foods can include avocados, fatty fish (like salmon), raw and unsalted nuts, nut butters (like peanut and almond butters), seeds (flax and pumpkin seeds), and liquid vegetable oils (canola, corn, olive, peanut, safflower, sesame, soybean, and sunflower oil).  Do not focus on reading labels for cholesterol in foods  The cholesterol found on the food label has little impact on your blood cholesterol levels  Include foods that are high in soluble fiber in your diet  Soluble fiber is not absorbed in your intestines and creates a thick, jelly mass that binds to cholesterol and helps to remove it from the body   Common food sources of soluble fiber include whole grains (oats, barley, quinoa, bran), plant proteins (beans, chick peas, lima beans, soy beans), vegetables (broccoli, brussels sprouts, cabbage, carrots, green beans, okra, onions, parsnips, and many more), starchy vegetables (sweet potatoes, sweet peas) fruit (apples, bananas, oranges, berries, peaches, mangos and so many more), and healthy fats (avocado, anuradha seeds, ground falx seeds)   High sugar beverages can raise your cholesterol levels  Limit sugary beverages such as pop/soda, sweetened tea, and lemonade  Instead, choose water, flavored water,  coffee (avoid cream or creamer high in saturated fat), and unsweetened tea  Avoid drinking alcohol as it can raise cholesterol levels  Aim for daily physical activity!    It is recommended for adults to get 150 minutes a week of moderate physical intensity     Educational Handouts Provided: NCM LDL Cholesterol-Lowering MNT     Nutrition Counseling:   Nutrition Counseling Strategies : Nutrition counseling based on motivational interviewing strategy, Nutrition counseling based on goal setting strategy  Patient Goals:   To lower his total cholesterol levels.     Readiness to Change: Good   Level of Understanding: Good  Anticipated Compliance: Good       Nutrition Monitoring and Evaluation   Meal/Snack Pattern: Estimated meal and snack pattern  Criteria: Monitor patient's progress towards meal and snack goal(s)         Body Weight: Measured body weight  Criteria: weight stable    Lipid Profile: Triglycerides, serum  Criteria: CHOL <200;  HDL 40-60;  LDL <100;  TG <150 mg/dL                 Follow Up: 3 months                [1]   Patient Active Problem List  Diagnosis    Anxiety    Hyperlipidemia    Dietary counseling and surveillance

## 2025-08-08 ENCOUNTER — NUTRITION (OUTPATIENT)
Dept: NUTRITION | Facility: HOSPITAL | Age: 40
End: 2025-08-08
Payer: COMMERCIAL

## 2025-08-08 DIAGNOSIS — E78.5 HYPERLIPIDEMIA, UNSPECIFIED HYPERLIPIDEMIA TYPE: Primary | ICD-10-CM

## 2025-08-08 DIAGNOSIS — Z71.3 DIETARY COUNSELING AND SURVEILLANCE: ICD-10-CM

## 2025-08-08 PROCEDURE — 97802 MEDICAL NUTRITION INDIV IN: CPT

## 2025-11-14 ENCOUNTER — APPOINTMENT (OUTPATIENT)
Dept: NUTRITION | Facility: HOSPITAL | Age: 40
End: 2025-11-14
Payer: COMMERCIAL